# Patient Record
Sex: FEMALE | URBAN - METROPOLITAN AREA
[De-identification: names, ages, dates, MRNs, and addresses within clinical notes are randomized per-mention and may not be internally consistent; named-entity substitution may affect disease eponyms.]

---

## 2024-12-09 ENCOUNTER — APPOINTMENT (RX ONLY)
Dept: URBAN - METROPOLITAN AREA CLINIC 42 | Facility: CLINIC | Age: 39
Setting detail: DERMATOLOGY
End: 2024-12-09

## 2024-12-09 DIAGNOSIS — Z41.9 ENCOUNTER FOR PROCEDURE FOR PURPOSES OTHER THAN REMEDYING HEALTH STATE, UNSPECIFIED: ICD-10-CM

## 2024-12-09 PROCEDURE — ? BOTOX

## 2024-12-09 NOTE — PROCEDURE: BOTOX
Nasal Root Units: 0
Show Additional Area 4: Yes
Incrementing Botox Units: By 0.5 Units
Show Right And Left Periorbital Units: No
Detail Level: Detailed
Expiration Date (Month Year): 09/2026
Forehead Units: 16
Glabellar Complex Units: 18
Price (Use Numbers Only, No Special Characters Or $): 055
Lot #: [id number]
Dilution (U/0.1 Cc): 10
Consent: Written consent obtained. Risks include but not limited to lid/brow ptosis, bruising, swelling, diplopia, temporary effect, incomplete chemical denervation.\\n\\nPresents for Botox in her upper face. She is not naive to Botox and had her last treatment 1 year ago in her frontalis and glabella.\\n\\nPMHx, allergies, medications reviewed.\\nPhotos and consents obtained.\\nSkin cleansed with alcohol and marked.\\n\\nTolerated well. Arnica gel applied. Aftercare instructions reviewed. Follow up in the office in 2 weeks.
Post-Care Instructions: Patient instructed to not lie down for 4 hours and limit physical activity for 24 hours.
Periorbital Skin Units: 8

## 2025-05-28 ENCOUNTER — APPOINTMENT (OUTPATIENT)
Dept: URBAN - METROPOLITAN AREA CLINIC 42 | Facility: CLINIC | Age: 40
Setting detail: DERMATOLOGY
End: 2025-05-28

## 2025-05-28 DIAGNOSIS — Z41.9 ENCOUNTER FOR PROCEDURE FOR PURPOSES OTHER THAN REMEDYING HEALTH STATE, UNSPECIFIED: ICD-10-CM

## 2025-05-28 PROCEDURE — ? BOTOX

## 2025-05-28 NOTE — PROCEDURE: BOTOX
Additional Area 2 Units: 0
Show Right And Left Brow Units: No
Show Masseter Units: Yes
Post-Care Instructions: Patient instructed to not lie down for 4 hours and limit physical activity for 24 hours.
Periorbital Skin Units: 8
Incrementing Botox Units: By 0.5 Units
Expiration Date (Month Year): 04/2027
Lot #: [id number]
Detail Level: Detailed
Price (Use Numbers Only, No Special Characters Or $): 227
Forehead Units: 16
Dilution (U/0.1 Cc): 10
Glabellar Complex Units: 18
Consent: Written consent obtained. Risks include but not limited to lid/brow ptosis, bruising, swelling, diplopia, temporary effect, incomplete chemical denervation.\\n\\nPresents for Botox in her upper face. She reports being very pleased with her previous treatment result and would like the same dose in the same treatment areas today. \\n\\nPMHx, allergies, medications reviewed.\\nPhotos and consents obtained.\\nSkin cleansed with alcohol and marked.\\n\\nTolerated well. Arnica gel applied. Aftercare instructions reviewed. Follow up in the office in 3 months or PRN for next Botox treatment.